# Patient Record
Sex: FEMALE | Race: WHITE | NOT HISPANIC OR LATINO | URBAN - METROPOLITAN AREA
[De-identification: names, ages, dates, MRNs, and addresses within clinical notes are randomized per-mention and may not be internally consistent; named-entity substitution may affect disease eponyms.]

---

## 2021-11-29 ENCOUNTER — APPOINTMENT (RX ONLY)
Dept: URBAN - METROPOLITAN AREA CLINIC 10 | Facility: CLINIC | Age: 46
Setting detail: DERMATOLOGY
End: 2021-11-29

## 2021-11-29 PROBLEM — D23.39 OTHER BENIGN NEOPLASM OF SKIN OF OTHER PARTS OF FACE: Status: ACTIVE | Noted: 2021-11-29

## 2021-11-29 PROCEDURE — ? TREATMENT REGIMEN

## 2021-11-29 PROCEDURE — 99202 OFFICE O/P NEW SF 15 MIN: CPT

## 2021-11-29 PROCEDURE — ? PRESCRIPTION

## 2021-11-29 PROCEDURE — ? COUNSELING

## 2021-11-29 NOTE — PROCEDURE: TREATMENT REGIMEN
Plan: Discussed starting with Tretinoin and if that does not work surgically removing it. We discussed that the surgical removal is not always successful in that the pore can be transected and recur.
Detail Level: Simple